# Patient Record
Sex: FEMALE | Race: WHITE | NOT HISPANIC OR LATINO | URBAN - METROPOLITAN AREA
[De-identification: names, ages, dates, MRNs, and addresses within clinical notes are randomized per-mention and may not be internally consistent; named-entity substitution may affect disease eponyms.]

---

## 2017-05-24 ENCOUNTER — EMERGENCY (EMERGENCY)
Facility: HOSPITAL | Age: 67
LOS: 0 days | Discharge: HOME | End: 2017-05-25
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
## 2017-06-28 DIAGNOSIS — S00.83XA CONTUSION OF OTHER PART OF HEAD, INITIAL ENCOUNTER: ICD-10-CM

## 2017-06-28 DIAGNOSIS — Z90.10 ACQUIRED ABSENCE OF UNSPECIFIED BREAST AND NIPPLE: ICD-10-CM
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
## 2018-04-27 ENCOUNTER — INPATIENT (INPATIENT)
Facility: HOSPITAL | Age: 68
LOS: 3 days | Discharge: HOME | End: 2018-05-01
Attending: INTERNAL MEDICINE | Admitting: INTERNAL MEDICINE

## 2018-04-27 VITALS
HEART RATE: 75 BPM | OXYGEN SATURATION: 98 % | DIASTOLIC BLOOD PRESSURE: 66 MMHG | SYSTOLIC BLOOD PRESSURE: 135 MMHG | RESPIRATION RATE: 18 BRPM | TEMPERATURE: 97 F

## 2018-04-27 RX ORDER — DIPHENHYDRAMINE HCL 50 MG
50 CAPSULE ORAL ONCE
Qty: 0 | Refills: 0 | Status: DISCONTINUED | OUTPATIENT
Start: 2018-04-27 | End: 2018-04-28

## 2018-04-27 NOTE — ED PROVIDER NOTE - NS ED ROS FT
Review of Systems:  	•	CONSTITUTIONAL - no fever, no diaphoresis, no weight change  	•	SKIN - no rash  	•	HEMATOLOGIC - no bleeding, no bruising  	•	EYES - no eye pain, no blurred vision  	•	ENT - no change in hearing, no pain  	•	RESPIRATORY - no shortness of breath, no cough  	•	CARDIAC - no chest pain, no palpitations  	•	GI - no abd pain, no nausea, no vomiting, no diarrhea, no constipation, no bleeding  	•	ENDO - no polydypsia, no polyurea, no heat/no cold intolerance  	•	MUSCULOSKELETAL - no joint paint, no swelling, no redness  	•	NEUROLOGIC - no weakness, no headache, no anesthesia, no paresthesias, intoxicated

## 2018-04-27 NOTE — ED ADULT TRIAGE NOTE - CHIEF COMPLAINT QUOTE
pt drank some whine and took a pill of valium to go to sleep.  Denies any suicidal/homicidal ideations.

## 2018-04-27 NOTE — ED PROVIDER NOTE - PHYSICAL EXAMINATION
VITAL SIGNS: I have reviewed nursing notes and confirm.  CONSTITUTIONAL: Well-developed; well-nourished; in no acute distress.  SKIN: Skin exam is warm and dry, no acute rash.  HEAD: Normocephalic; atraumatic.  EYES: PERRL, EOM intact; conjunctiva and sclera clear.  ENT: No nasal discharge; airway clear. TMs clear.  NECK: Supple; non tender.  CARD: S1, S2 normal; no murmurs, gallops, or rubs. Regular rate and rhythm.  RESP: No wheezes, rales or rhonchi.  ABD: Normal bowel sounds; soft; non-distended; non-tender; no hepatosplenomegaly.  EXT: Normal ROM. No clubbing, cyanosis or edema.  NEURO: Alert, oriented. Grossly unremarkable. No focal deficits. slurred speech, moving all ext  PSYCH: Cooperative, appropriate. intoxicated,

## 2018-04-27 NOTE — ED PROVIDER NOTE - OBJECTIVE STATEMENT
68 yo f with pmh of breast ca, etoh abuse, biba with c/o intox.   called ems because pt was unable to walk and was not making any sense.  pt now reports that she took 2 10mg valium (her usual night dose) but also drank a bottle of wine in the car.   says was given rx for valium because she has been having difficulty sleeping.  pt denies any symptoms besides feeling sleepy.  no headache, no trauma, no cp, no sob, no abd pain, no n/v/d.

## 2018-04-28 DIAGNOSIS — Z90.10 ACQUIRED ABSENCE OF UNSPECIFIED BREAST AND NIPPLE: Chronic | ICD-10-CM

## 2018-04-28 DIAGNOSIS — F19.10 OTHER PSYCHOACTIVE SUBSTANCE ABUSE, UNCOMPLICATED: ICD-10-CM

## 2018-04-28 LAB
ALBUMIN SERPL ELPH-MCNC: 4.8 G/DL — SIGNIFICANT CHANGE UP (ref 3.5–5.2)
ALP SERPL-CCNC: 104 U/L — SIGNIFICANT CHANGE UP (ref 30–115)
ALT FLD-CCNC: 17 U/L — SIGNIFICANT CHANGE UP (ref 0–41)
AMPHET UR-MCNC: NEGATIVE — SIGNIFICANT CHANGE UP
ANION GAP SERPL CALC-SCNC: 15 MMOL/L — HIGH (ref 7–14)
APAP SERPL-MCNC: 6 UG/ML — LOW (ref 10–30)
APPEARANCE UR: CLEAR — SIGNIFICANT CHANGE UP
AST SERPL-CCNC: 23 U/L — SIGNIFICANT CHANGE UP (ref 0–41)
BACTERIA # UR AUTO: (no result)
BARBITURATES UR SCN-MCNC: NEGATIVE — SIGNIFICANT CHANGE UP
BENZODIAZ UR-MCNC: POSITIVE
BILIRUB SERPL-MCNC: 0.2 MG/DL — SIGNIFICANT CHANGE UP (ref 0.2–1.2)
BILIRUB UR-MCNC: NEGATIVE — SIGNIFICANT CHANGE UP
BUN SERPL-MCNC: 18 MG/DL — SIGNIFICANT CHANGE UP (ref 10–20)
CALCIUM SERPL-MCNC: 9.3 MG/DL — SIGNIFICANT CHANGE UP (ref 8.5–10.1)
CHLORIDE SERPL-SCNC: 103 MMOL/L — SIGNIFICANT CHANGE UP (ref 98–110)
CO2 SERPL-SCNC: 26 MMOL/L — SIGNIFICANT CHANGE UP (ref 17–32)
COCAINE METAB.OTHER UR-MCNC: NEGATIVE — SIGNIFICANT CHANGE UP
COLOR SPEC: YELLOW — SIGNIFICANT CHANGE UP
CREAT SERPL-MCNC: 1 MG/DL — SIGNIFICANT CHANGE UP (ref 0.7–1.5)
DIFF PNL FLD: (no result)
DRUG SCREEN 1, URINE RESULT: SIGNIFICANT CHANGE UP
EPI CELLS # UR: (no result) /HPF
GLUCOSE SERPL-MCNC: 88 MG/DL — SIGNIFICANT CHANGE UP (ref 70–99)
GLUCOSE UR QL: NEGATIVE MG/DL — SIGNIFICANT CHANGE UP
HAV IGM SER-ACNC: SIGNIFICANT CHANGE UP
HBV CORE IGM SER-ACNC: SIGNIFICANT CHANGE UP
HBV SURFACE AG SER-ACNC: SIGNIFICANT CHANGE UP
HCT VFR BLD CALC: 38.3 % — SIGNIFICANT CHANGE UP (ref 37–47)
HCV AB S/CO SERPL IA: 0.16 S/CO — SIGNIFICANT CHANGE UP
HCV AB SERPL-IMP: SIGNIFICANT CHANGE UP
HGB BLD-MCNC: 12.9 G/DL — SIGNIFICANT CHANGE UP (ref 12–16)
KETONES UR-MCNC: NEGATIVE — SIGNIFICANT CHANGE UP
LEUKOCYTE ESTERASE UR-ACNC: NEGATIVE — SIGNIFICANT CHANGE UP
MCHC RBC-ENTMCNC: 31 PG — SIGNIFICANT CHANGE UP (ref 27–31)
MCHC RBC-ENTMCNC: 33.7 G/DL — SIGNIFICANT CHANGE UP (ref 32–37)
MCV RBC AUTO: 92.1 FL — SIGNIFICANT CHANGE UP (ref 81–99)
METHADONE UR-MCNC: NEGATIVE — SIGNIFICANT CHANGE UP
NITRITE UR-MCNC: NEGATIVE — SIGNIFICANT CHANGE UP
NRBC # BLD: 0 /100 WBCS — SIGNIFICANT CHANGE UP (ref 0–0)
OPIATES UR-MCNC: POSITIVE
PCP UR-MCNC: NEGATIVE — SIGNIFICANT CHANGE UP
PH UR: 5.5 — SIGNIFICANT CHANGE UP (ref 5–8)
PLATELET # BLD AUTO: 261 K/UL — SIGNIFICANT CHANGE UP (ref 130–400)
POTASSIUM SERPL-MCNC: 3.9 MMOL/L — SIGNIFICANT CHANGE UP (ref 3.5–5)
POTASSIUM SERPL-SCNC: 3.9 MMOL/L — SIGNIFICANT CHANGE UP (ref 3.5–5)
PROPOXYPHENE QUALITATIVE URINE RESULT: NEGATIVE — SIGNIFICANT CHANGE UP
PROT SERPL-MCNC: 7.3 G/DL — SIGNIFICANT CHANGE UP (ref 6–8)
PROT UR-MCNC: NEGATIVE MG/DL — SIGNIFICANT CHANGE UP
RBC # BLD: 4.16 M/UL — LOW (ref 4.2–5.4)
RBC # FLD: 11.9 % — SIGNIFICANT CHANGE UP (ref 11.5–14.5)
SALICYLATES SERPL-MCNC: 1.6 MG/DL — LOW (ref 4–30)
SODIUM SERPL-SCNC: 144 MMOL/L — SIGNIFICANT CHANGE UP (ref 135–146)
SP GR SPEC: 1.01 — SIGNIFICANT CHANGE UP (ref 1.01–1.03)
THC UR QL: NEGATIVE — SIGNIFICANT CHANGE UP
UROBILINOGEN FLD QL: 0.2 MG/DL — SIGNIFICANT CHANGE UP (ref 0.2–0.2)
WBC # BLD: 6.08 K/UL — SIGNIFICANT CHANGE UP (ref 4.8–10.8)
WBC # FLD AUTO: 6.08 K/UL — SIGNIFICANT CHANGE UP (ref 4.8–10.8)
WBC UR QL: (no result) /HPF

## 2018-04-28 RX ORDER — IBUPROFEN 200 MG
400 TABLET ORAL EVERY 6 HOURS
Qty: 0 | Refills: 0 | Status: DISCONTINUED | OUTPATIENT
Start: 2018-04-28 | End: 2018-05-01

## 2018-04-28 RX ORDER — HYDROXYZINE HCL 10 MG
50 TABLET ORAL EVERY 6 HOURS
Qty: 0 | Refills: 0 | Status: DISCONTINUED | OUTPATIENT
Start: 2018-04-28 | End: 2018-05-01

## 2018-04-28 RX ORDER — TUBERCULIN PURIFIED PROTEIN DERIVATIVE 5 [IU]/.1ML
5 INJECTION, SOLUTION INTRADERMAL ONCE
Qty: 0 | Refills: 0 | Status: COMPLETED | OUTPATIENT
Start: 2018-04-28 | End: 2018-04-28

## 2018-04-28 RX ORDER — THIAMINE MONONITRATE (VIT B1) 100 MG
100 TABLET ORAL DAILY
Qty: 0 | Refills: 0 | Status: COMPLETED | OUTPATIENT
Start: 2018-04-28 | End: 2018-04-30

## 2018-04-28 RX ORDER — ACETAMINOPHEN 500 MG
650 TABLET ORAL EVERY 6 HOURS
Qty: 0 | Refills: 0 | Status: DISCONTINUED | OUTPATIENT
Start: 2018-04-28 | End: 2018-05-01

## 2018-04-28 RX ORDER — MAGNESIUM HYDROXIDE 400 MG/1
30 TABLET, CHEWABLE ORAL DAILY
Qty: 0 | Refills: 0 | Status: DISCONTINUED | OUTPATIENT
Start: 2018-04-28 | End: 2018-05-01

## 2018-04-28 RX ORDER — HYDROXYZINE HCL 10 MG
100 TABLET ORAL AT BEDTIME
Qty: 0 | Refills: 0 | Status: DISCONTINUED | OUTPATIENT
Start: 2018-04-28 | End: 2018-05-01

## 2018-04-28 RX ADMIN — TUBERCULIN PURIFIED PROTEIN DERIVATIVE 5 UNIT(S): 5 INJECTION, SOLUTION INTRADERMAL at 10:23

## 2018-04-28 RX ADMIN — Medication 100 MILLIGRAM(S): at 09:03

## 2018-04-28 RX ADMIN — Medication 100 MILLIGRAM(S): at 23:35

## 2018-04-28 RX ADMIN — Medication 1 TABLET(S): at 09:02

## 2018-04-28 NOTE — H&P ADULT - ATTENDING COMMENTS
Patient interviewed and examined.    Chart reviewed.    PA's H&P noted and modified, as appropriate.    Case discussed on team rounds    Following is my summary of the case.    Admitted for detox: from ____ED, _x__Intake, ____Med/Surg Floor    Alcohol__x__   Opioid_____  Benzo___ Other_____    Substance amount, duration of use, last usage, and prior attempts at detox or rehabs, are outlined above in the H&P and discussed with patient.    Associated withdrawal symptoms presents.  Comorbid conditions noted. Chronic and Stable.    Past Medical Hx, Psych Hx, family Hx, Social Hx from H&P reviewed and NO changes.    Old medical record and medication Hx. Reviewed    Following items reviewed and addressed:  1. labs  2. EKG  3. Imaging from PACs module    Examination: no change from PA's exam.    Place on following protocol  _____Medically Managed  __X__Medically Supervised    Ciwa__x___Librium taper____Ativan taper___Methadone taper___ Phenobarb taper____ Suboxone Induction____MMTP____    Narcan Kit Offered    Psych Consult __X__N/A  ___Ordered    Physical Therapy  ___X N/A    ___  Ordered    Aftercare disposition to be addressed by counselors.    Estimated length of stay 3-5 days.

## 2018-04-28 NOTE — H&P ADULT - NSHPREVIEWOFSYSTEMS_GEN_ALL_CORE
· Review of Systems: Review of Systems:  		•	CONSTITUTIONAL - no fever, no diaphoresis, no weight change  		•	SKIN - no rash  		•	HEMATOLOGIC - no bleeding, no bruising  		•	EYES - no eye pain, no blurred vision  		•	ENT - no change in hearing, no pain  		•	RESPIRATORY - no shortness of breath, no cough  		•	CARDIAC - no chest pain, no palpitations  		•	GI - no abd pain, no nausea, no vomiting, no diarrhea, no constipation, no bleeding  		•	ENDO - no polydypsia, no polyurea, no heat/no cold intolerance  		•	MUSCULOSKELETAL - no joint paint, no swelling, no redness  	•	NEUROLOGIC - no weakness, no headache, no anesthesia, no paresthesias, intoxicated

## 2018-04-28 NOTE — H&P ADULT - HISTORY OF PRESENT ILLNESS
· Chief Complaint: The patient is a 67y Female complaining of alcohol intoxication.	  · HPI Objective Statement: 68 yo f with pmh of breast ca, etoh abuse, biba with c/o intox.   called ems because pt was unable to walk and was not making any sense.  pt now reports that she took 2 10mg valium (her usual night dose) but also drank a bottle of wine in the car.   says was given rx for valium because she has been having difficulty sleeping.  pt denies any symptoms besides feeling sleepy.  no headache, no trauma, no cp, no sob, no abd pain, no n/v/d. NO H/O  SEIZURES NO  H/O DTS	    PAST MEDICAL/SURGICAL/FAMILY/SOCIAL HISTORY:    Tobacco Usage:  · Tobacco Usage	Unknown if ever smoked	    · Attestation Comment: I have reviewed and confirmed nurses' notes for patient's medications, allergies, medical history, and surgical history.	    ALLERGIES AND HOME MEDICATIONS:   Allergies:        Allergies:  	No Known Allergies:     Home Medications:   * Outpatient Medication Status not yet specified      PHYSICAL EXAM:   · Physical Examination: VITAL SIGNS: I have reviewed nursing notes and confirm.  CONSTITUTIONAL: Well-developed; well-nourished; in no acute distress.  SKIN: Skin exam is warm and dry, no acute rash.  HEAD: Normocephalic; atraumatic.  EYES: PERRL, EOM intact; conjunctiva and sclera clear.  ENT: No nasal discharge; airway clear. TMs clear.  NECK: Supple; non tender.  CARD: S1, S2 normal; no murmurs, gallops, or rubs. Regular rate and rhythm.  RESP: No wheezes, rales or rhonchi.  ABD: Normal bowel sounds; soft; non-distended; non-tender; no hepatosplenomegaly.  EXT: Normal ROM. No clubbing, cyanosis or edema.  NEURO: Alert, oriented. Grossly unremarkable. No focal deficits. slurred speech, moving all ext PSYCH: Cooperative, appropriate. intoxicated,

## 2018-04-28 NOTE — H&P ADULT - NSHPLABSRESULTS_GEN_ALL_CORE
12.9   6.08  )-----------( 261      ( 27 Apr 2018 22:25 )             38.3   04-27    144  |  103  |  18  ----------------------------<  88  3.9   |  26  |  1.0    Ca    9.3      27 Apr 2018 22:25    TPro  7.3  /  Alb  4.8  /  TBili  0.2  /  DBili  x   /  AST  23  /  ALT  17  /  AlkPhos  104  04-27

## 2018-04-28 NOTE — CHART NOTE - NSCHARTNOTEFT_GEN_A_CORE
PPD placed RFA 4/28/2018 to be read 48-72 hours.  EB, RRT PPD placed RFA 4/28/2018 to be read 48-72 hours.  EB, RRT    PPD read 4/30/18  @ 11:30    0mm induration-  Eli SANTOS

## 2018-04-29 RX ORDER — NICOTINE POLACRILEX 2 MG
1 GUM BUCCAL DAILY
Qty: 0 | Refills: 0 | Status: DISCONTINUED | OUTPATIENT
Start: 2018-04-29 | End: 2018-05-01

## 2018-04-29 RX ADMIN — Medication 1 TABLET(S): at 09:47

## 2018-04-29 RX ADMIN — Medication 400 MILLIGRAM(S): at 15:37

## 2018-04-29 RX ADMIN — Medication 100 MILLIGRAM(S): at 09:47

## 2018-04-29 RX ADMIN — Medication 400 MILLIGRAM(S): at 15:35

## 2018-04-29 RX ADMIN — Medication 650 MILLIGRAM(S): at 17:10

## 2018-04-29 RX ADMIN — Medication 1 PATCH: at 09:45

## 2018-04-29 RX ADMIN — Medication 100 MILLIGRAM(S): at 22:18

## 2018-04-29 NOTE — CHART NOTE - NSCHARTNOTEFT_GEN_A_CORE
Subsequent Inpatient Encounter                                       Detox Unit    GEOVANNY MIRANDA   67y   Female      Chief Complaint:    Follow up for Alcohol  Dependency    HPI:     I reviewed previous notes. No Change, except if noted below.             Detail:_    ROS:   I reviewed with patient.  No changes from previous notes except if noted below.             Detail: _    PFSH I reviewed with patient. No changes from previous notes except if noted below.             Detail_    Medication reconciliation performed.    MEDICATIONS  (STANDING):  multivitamin 1 Tablet(s) Oral daily  thiamine 100 milliGRAM(s) Oral daily      MEDICATIONS  (PRN):  acetaminophen   Tablet 650 milliGRAM(s) Oral every 6 hours PRN For Temp greater than 38 C (100.4 F)  aluminum hydroxide/magnesium hydroxide/simethicone Suspension 30 milliLiter(s) Oral every 4 hours PRN Dyspepsia  chlordiazePOXIDE 25 milliGRAM(s) Oral every 2 hours PRN Alcohol Withdrawal Symptoms  chlordiazePOXIDE 50 milliGRAM(s) Oral every 1 hour PRN Alcohol Withdrawal Symptoms  cloNIDine 0.1 milliGRAM(s) Oral every 8 hours PRN SBP>140  hydrOXYzine hydrochloride 50 milliGRAM(s) Oral every 6 hours PRN Anxiety  hydrOXYzine hydrochloride 100 milliGRAM(s) Oral at bedtime PRN INSOMNIA  ibuprofen  Tablet 400 milliGRAM(s) Oral every 6 hours PRN MUSCLE DISCOMFORT  magnesium hydroxide Suspension 30 milliLiter(s) Oral daily PRN Constipation      T(C): 35.7 (18 @ 06:00), Max: 36.7 (18 @ 21:00)  HR: 71 (18 @ 06:00) (59 - 80)  BP: 94/52 (18 @ 06:00) (94/52 - 145/74)  RR: 14 (18 @ 06:00) (14 - 18)  SpO2: --    PHYSICAL EXAM:      Constitutional: NAD, A&O x3    Eyes: PERRLA, no conjuctivitis    Neck: no lymphadenopathy    Respiratory: +air entry, no rales, no rhonchi, no wheezes    Cardiovascular: +S1 and S2, regular rate and rhythm    Gastrointestinal: +BS, soft, non-tender, not distended    Extremities:  no edema, no calf tenderness    Skin: no rashes, normal turgor                            12.9   6.08  )-----------( 261      ( 2018 22:25 )             38.3       144  |  103  |  18  ----------------------------<  88  3.9   |  26  |  1.0    Ca    9.3      2018 22:25    TPro  7.3  /  Alb  4.8  /  TBili  0.2  /  DBili  x   /  AST  23  /  ALT  17  /  AlkPhos  104      Hepatitis B Surface Antigen: Nonreact (18 @ 22:25)  Hepatitis C Virus S/CO Ratio: 0.16 S/CO (18 @ 22:25)    Hepatitis C Virus Interpretation: Nonreact (18 @ 22:25)      Urinalysis Basic - ( 2018 06:43 )    Color: Yellow / Appearance: Clear / S.015 / pH: x  Gluc: x / Ketone: Negative  / Bili: Negative / Urobili: 0.2 mg/dL   Blood: x / Protein: Negative mg/dL / Nitrite: Negative   Leuk Esterase: Negative / RBC: x / WBC 6-10 /HPF   Sq Epi: x / Non Sq Epi: Few /HPF / Bacteria: Few    Drug Screen 1, Urine Result: Done (18 @ 06:43)        Impression and Plan:    Primary Diagnosis:  Alcohol Dependency                                Medication: Librium CIWA Protocol        Continue Detox Protocols. Use of PRNS as needed for withdrawal and comfort.      Labs/ Tests reviewed.      Likely Disposition: ___Home       ___Rehab       ___Outpatient Program    ___Self Help     _____Other    Estimated Length of stay: 2 days

## 2018-04-30 RX ADMIN — Medication 400 MILLIGRAM(S): at 01:33

## 2018-04-30 RX ADMIN — Medication 100 MILLIGRAM(S): at 23:48

## 2018-04-30 RX ADMIN — TUBERCULIN PURIFIED PROTEIN DERIVATIVE 5 UNIT(S): 5 INJECTION, SOLUTION INTRADERMAL at 11:12

## 2018-04-30 RX ADMIN — Medication 1 TABLET(S): at 09:05

## 2018-04-30 RX ADMIN — Medication 100 MILLIGRAM(S): at 09:05

## 2018-04-30 RX ADMIN — Medication 1 PATCH: at 09:08

## 2018-04-30 RX ADMIN — Medication 1 PATCH: at 11:58

## 2018-04-30 RX ADMIN — Medication 400 MILLIGRAM(S): at 09:16

## 2018-04-30 NOTE — CHART NOTE - NSCHARTNOTEFT_GEN_A_CORE
Allergies:  No Known Allergies      Diet: Regular    Activity: as tolerated    Follow up with    1. PMD in 2 weeks    2. Psych in 2 weeks    3.    Follow up for abnormal labs/tests    1.    Extra Instructions:      Flu Vaccine given  Yes_____         No______      Diagnosis:  Chemical Dependency   Maintain sobriety  refrain from all use      Patient Signature___________________________________________  Date_________________      Nurse Signature_____________________________________________Date_________________ Allergies:  No Known Allergies      Diet: Regular    Activity: as tolerated    Follow up with    1. PMD in 2 weeks    2. Psych in 2 weeks    3.Pulmonary in 2 weeks    Follow up for abnormal labs/tests    1. needs Ct scan of chest for abnormal cxr-nodule    Extra Instructions:      Flu Vaccine given  Yes_____         No______      Diagnosis:  Chemical Dependency   Maintain sobriety  refrain from all use      Patient Signature___________________________________________  Date_________________      Nurse Signature_____________________________________________Date_________________

## 2018-04-30 NOTE — CHART NOTE - NSCHARTNOTEFT_GEN_A_CORE
Subsequent Inpatient Encounter                                       Detox Unit    GEOVANNY MIRANDA   67y   Female      Chief Complaint:    Follow up for Alcohol  Dependency    HPI:     I reviewed previous notes. No Change, except if noted below.             Detail:_    ROS:   I reviewed with patient.  No changes from previous notes except if noted below.             Detail: _    PFSH I reviewed with patient. No changes from previous notes except if noted below.             Detail_    Medication reconciliation performed.    MEDICATIONS  (STANDING):  multivitamin 1 Tablet(s) Oral daily  thiamine 100 milliGRAM(s) Oral daily      MEDICATIONS  (PRN):  acetaminophen   Tablet 650 milliGRAM(s) Oral every 6 hours PRN For Temp greater than 38 C (100.4 F)  aluminum hydroxide/magnesium hydroxide/simethicone Suspension 30 milliLiter(s) Oral every 4 hours PRN Dyspepsia  chlordiazePOXIDE 25 milliGRAM(s) Oral every 2 hours PRN Alcohol Withdrawal Symptoms  chlordiazePOXIDE 50 milliGRAM(s) Oral every 1 hour PRN Alcohol Withdrawal Symptoms  cloNIDine 0.1 milliGRAM(s) Oral every 8 hours PRN SBP>140  hydrOXYzine hydrochloride 50 milliGRAM(s) Oral every 6 hours PRN Anxiety  hydrOXYzine hydrochloride 100 milliGRAM(s) Oral at bedtime PRN INSOMNIA  ibuprofen  Tablet 400 milliGRAM(s) Oral every 6 hours PRN MUSCLE DISCOMFORT  magnesium hydroxide Suspension 30 milliLiter(s) Oral daily PRN Constipation      T(C): 35.7 (18 @ 06:00), Max: 36.7 (18 @ 21:00)  HR: 71 (18 @ 06:00) (59 - 80)  BP: 94/52 (18 @ 06:00) (94/52 - 145/74)  RR: 14 (18 @ 06:00) (14 - 18)  SpO2: --    PHYSICAL EXAM:      Constitutional: NAD, A&O x3    Eyes: PERRLA, no conjuctivitis    Neck: no lymphadenopathy    Respiratory: +air entry, no rales, no rhonchi, no wheezes    Cardiovascular: +S1 and S2, regular rate and rhythm    Gastrointestinal: +BS, soft, non-tender, not distended    Extremities:  no edema, no calf tenderness    Skin: no rashes, normal turgor                            12.9   6.08  )-----------( 261      ( 2018 22:25 )             38.3       144  |  103  |  18  ----------------------------<  88  3.9   |  26  |  1.0    Ca    9.3      2018 22:25    TPro  7.3  /  Alb  4.8  /  TBili  0.2  /  DBili  x   /  AST  23  /  ALT  17  /  AlkPhos  104      Hepatitis B Surface Antigen: Nonreact (18 @ 22:25)  Hepatitis C Virus S/CO Ratio: 0.16 S/CO (18 @ 22:25)    Hepatitis C Virus Interpretation: Nonreact (18 @ 22:25)      Urinalysis Basic - ( 2018 06:43 )    Color: Yellow / Appearance: Clear / S.015 / pH: x  Gluc: x / Ketone: Negative  / Bili: Negative / Urobili: 0.2 mg/dL   Blood: x / Protein: Negative mg/dL / Nitrite: Negative   Leuk Esterase: Negative / RBC: x / WBC 6-10 /HPF   Sq Epi: x / Non Sq Epi: Few /HPF / Bacteria: Few    Drug Screen 1, Urine Result: Done (18 @ 06:43)        Impression and Plan:    Primary Diagnosis:  Alcohol Dependency                                Medication: Librium CIWA Protocol        Continue Detox Protocols. Use of PRNS as needed for withdrawal and comfort.      Labs/ Tests reviewed.      Likely Disposition: __X_Home       ___Rehab       ___Outpatient Program    ___Self Help     _____Other    Estimated Length of stay: 3 days

## 2018-05-01 VITALS
HEART RATE: 69 BPM | SYSTOLIC BLOOD PRESSURE: 110 MMHG | RESPIRATION RATE: 16 BRPM | DIASTOLIC BLOOD PRESSURE: 58 MMHG | TEMPERATURE: 97 F

## 2018-05-01 RX ADMIN — Medication 400 MILLIGRAM(S): at 08:16

## 2018-05-01 RX ADMIN — Medication 1 TABLET(S): at 08:13

## 2018-05-14 DIAGNOSIS — F10.20 ALCOHOL DEPENDENCE, UNCOMPLICATED: ICD-10-CM

## 2018-05-14 DIAGNOSIS — M54.5 LOW BACK PAIN: ICD-10-CM

## 2018-05-14 DIAGNOSIS — Z90.11 ACQUIRED ABSENCE OF RIGHT BREAST AND NIPPLE: ICD-10-CM

## 2018-05-14 DIAGNOSIS — Z85.3 PERSONAL HISTORY OF MALIGNANT NEOPLASM OF BREAST: ICD-10-CM
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
## 2019-01-23 ENCOUNTER — OUTPATIENT (OUTPATIENT)
Dept: OUTPATIENT SERVICES | Facility: HOSPITAL | Age: 69
LOS: 1 days | Discharge: HOME | End: 2019-01-23

## 2019-01-23 DIAGNOSIS — T43.624D: ICD-10-CM

## 2019-01-23 DIAGNOSIS — Z90.10 ACQUIRED ABSENCE OF UNSPECIFIED BREAST AND NIPPLE: Chronic | ICD-10-CM

## 2019-03-04 ENCOUNTER — EMERGENCY (EMERGENCY)
Facility: HOSPITAL | Age: 69
LOS: 0 days | Discharge: HOME | End: 2019-03-04
Attending: EMERGENCY MEDICINE | Admitting: EMERGENCY MEDICINE

## 2019-03-04 ENCOUNTER — EMERGENCY (EMERGENCY)
Facility: HOSPITAL | Age: 69
LOS: 0 days | Discharge: HOME | End: 2019-03-04
Attending: EMERGENCY MEDICINE | Admitting: EMERGENCY MEDICINE
Payer: MEDICARE

## 2019-03-04 VITALS
SYSTOLIC BLOOD PRESSURE: 128 MMHG | WEIGHT: 113.98 LBS | RESPIRATION RATE: 19 BRPM | TEMPERATURE: 98 F | OXYGEN SATURATION: 97 % | DIASTOLIC BLOOD PRESSURE: 80 MMHG | HEART RATE: 66 BPM

## 2019-03-04 VITALS
SYSTOLIC BLOOD PRESSURE: 131 MMHG | TEMPERATURE: 99 F | RESPIRATION RATE: 18 BRPM | OXYGEN SATURATION: 99 % | DIASTOLIC BLOOD PRESSURE: 76 MMHG | HEART RATE: 80 BPM

## 2019-03-04 VITALS
RESPIRATION RATE: 18 BRPM | OXYGEN SATURATION: 98 % | HEART RATE: 65 BPM | SYSTOLIC BLOOD PRESSURE: 113 MMHG | DIASTOLIC BLOOD PRESSURE: 58 MMHG

## 2019-03-04 DIAGNOSIS — Z90.10 ACQUIRED ABSENCE OF UNSPECIFIED BREAST AND NIPPLE: Chronic | ICD-10-CM

## 2019-03-04 DIAGNOSIS — M79.662 PAIN IN LEFT LOWER LEG: ICD-10-CM

## 2019-03-04 DIAGNOSIS — Z87.891 PERSONAL HISTORY OF NICOTINE DEPENDENCE: ICD-10-CM

## 2019-03-04 DIAGNOSIS — M25.579 PAIN IN UNSPECIFIED ANKLE AND JOINTS OF UNSPECIFIED FOOT: ICD-10-CM

## 2019-03-04 DIAGNOSIS — Z90.10 ACQUIRED ABSENCE OF UNSPECIFIED BREAST AND NIPPLE: ICD-10-CM

## 2019-03-04 DIAGNOSIS — M79.661 PAIN IN RIGHT LOWER LEG: ICD-10-CM

## 2019-03-04 DIAGNOSIS — M54.5 LOW BACK PAIN: ICD-10-CM

## 2019-03-04 PROCEDURE — 93970 EXTREMITY STUDY: CPT | Mod: 26

## 2019-03-04 RX ORDER — KETOROLAC TROMETHAMINE 30 MG/ML
30 SYRINGE (ML) INJECTION ONCE
Qty: 0 | Refills: 0 | Status: DISCONTINUED | OUTPATIENT
Start: 2019-03-04 | End: 2019-03-04

## 2019-03-04 RX ORDER — OXYCODONE AND ACETAMINOPHEN 5; 325 MG/1; MG/1
1 TABLET ORAL ONCE
Qty: 0 | Refills: 0 | Status: DISCONTINUED | OUTPATIENT
Start: 2019-03-04 | End: 2019-03-04

## 2019-03-04 RX ADMIN — OXYCODONE AND ACETAMINOPHEN 1 TABLET(S): 5; 325 TABLET ORAL at 15:20

## 2019-03-04 RX ADMIN — Medication 30 MILLIGRAM(S): at 19:55

## 2019-03-04 NOTE — ED PROVIDER NOTE - CARE PROVIDERS DIRECT ADDRESSES
[email or web address]

## 2019-03-04 NOTE — ED PROVIDER NOTE - CARE PROVIDER_API CALL
Jayce Crocker)  EEGEpilepsy; Neurology  [address], [address]  [place] [address]  Phone: [phone]  Fax: [phone]  Follow Up Time:     Gerardo Broussard)  Surgery  [address], [address] [place] [address]  Phone: [phone]  Fax: [phone]  Follow Up Time:

## 2019-03-04 NOTE — ED PROVIDER NOTE - PROGRESS NOTE DETAILS
Will give IM Toradol and DC. Patient has close follow-up. Strict return precaution signs/sxs given of when to return to ED.

## 2019-03-04 NOTE — ED PROVIDER NOTE - ATTENDING CONTRIBUTION TO CARE
67yo F h/o OA, lower back pain p/w left calf and ankle pain for months. Associated with decreased sensation and cold feet. Symptoms have alternated between right and left, no diff breathing, no change with leg elevation or exertion. On PRN gabapentin, with min relief. Exam demonstrates intact pulses. Venous insufficient skin changes. Sensation to soft touch intact, strength intact, left calf tend, no edema. No midline back tend. Neg SLR. Peripheral neuropathy less likely vascular insuff given intact pulses. Will eval for dvt and refer to neuro/ vasc

## 2019-03-04 NOTE — ED PROVIDER NOTE - CLINICAL SUMMARY MEDICAL DECISION MAKING FREE TEXT BOX
US negative. pt ambulatory Feels and appears well. No complaints at present. Eager to leave. Stable for discharge. Patient was given strict return and follow up precautions. The patient has been informed of all concerning signs and symptoms to return to Emergency Department, the necessity to follow up with PMD/Clinic/follow up provided within 2-3 days was explained, and the patient reports understanding of above with capacity and insight.

## 2019-03-04 NOTE — ED PROVIDER NOTE - OBJECTIVE STATEMENT
69yo F with PMH of scoliosis, paresthesias/chronic leg pain (mostly left-sided, takes Gabapentin), and mastectomy presenting with increased left leg pain. Patient has been evaluated twice in the last month for same problem and had two negative duplex ultrasounds done, just left [facility] ED a few hours PTA here. Patient states she was given a Percocet there that did not help her pain and just wants something to get through the night. Denies any CP, SOB, abdominal pain, incontinence, trauma to the area, cyanosis, edema, or history of DVT/PE. 67yo F with PMH of scoliosis, paresthesias/chronic leg pain (mostly left-sided, takes Gabapentin), and mastectomy presenting with increased left leg pain. Patient has been evaluated twice in the last month for same problem and had two negative duplex ultrasounds done, just left [facility] ED a few hours PTA here and had negative US for DVT. Patient states she was given a Percocet there that did not help her pain and just wants something to get through the night. Denies any CP, SOB, abdominal pain, incontinence, trauma to the area, cyanosis, edema, or history of DVT/PE.

## 2019-03-04 NOTE — ED PROVIDER NOTE - NS ED ROS FT
Constitutional:  No fevers or chills.  Eyes:  No visual changes.  ENT:  No sore throat.  Neck:  No neck pain.  Cardiac:  No CP or edema.  Resp:  No cough or SOB.  GI:  No nausea, vomiting, diarrhea, or abdominal pain.  MSK:  No myalgias, +left lower leg pain.  Neuro:  No headache, dizziness, or weakness.  Skin:  No skin rash.

## 2019-03-04 NOTE — ED PROVIDER NOTE - ATTENDING CONTRIBUTION TO CARE
68F to ED for eval of leg pain.  Acute on chronic exacerbation of pain. pt seen at [facility] south earlier today and d/c home.  pt present to ED north as pain not controlled.   AVSS, exam as noted,

## 2019-03-04 NOTE — ED ADULT TRIAGE NOTE - CHIEF COMPLAINT QUOTE
left leg pain, was just d'c, from [facility] site , pt. requesting pain meds and MRI/ venous duplex performed at [facility] was negative

## 2019-03-04 NOTE — ED ADULT NURSE NOTE - CHIEF COMPLAINT QUOTE
left leg pain, was just d'c, from [facility] site , pt. requesting pain meds and MRI/ venous duplex performed at [facility] was negative

## 2019-03-04 NOTE — ED PROVIDER NOTE - PHYSICAL EXAMINATION
GEN: Alert & Oriented x 3, No acute distress. Calm, appropriate.  Eyes: PERRL. No conjunctival injection. No scleral icterus.   RESP: Lungs clear to auscult bilat. no wheezes, rhonchi or rales. No retractions. Equal air entry.  CARDIO: regular rate and rhythm, no murmurs, rubs or gallops. Normal S1, S2. Radial pulses 2+ bilaterally. No lower extremity edema. Distal pedal pulses present bilaterally.  MS: Full ROM of extremities. No tenderness with palpation of lower extremities. No leg swelling. No calf tenderness.  ABD: Soft, non-distended. No tenderness with palpation x 4 quadrants.  SKIN: no rashes/lesions, no petechiae, no ecchymosis.  NEURO: CN II-XII grossly intact. Strength intact lower extremities. Decreased sensation bilateral feet. Speech and cognition normal.

## 2019-03-04 NOTE — ED PROVIDER NOTE - OBJECTIVE STATEMENT
The patient is a 68y Female with PMH of osteoarthritis and scoliosis is presenting to ED with bilateral leg pain x 2 days. Patient states she has had intermittent lower extremity pain and paresthesias for 1 mo. She states it normally goes away within the day but states it's been constant since yesterday. The patient is a 68y Female with PMH of osteoarthritis and scoliosis is presenting to ED with bilateral leg pain x 2 days. Patient states she has had intermittent lower extremity pain and paresthesias for 1 mo. She states it normally goes away within the day but states it's been constant since yesterday. She states she takes gabapentin as needed for the pain. She also had an US performed 1mo ago, unsure of results. She denies trauma, lower extremity swelling, back pain, skin changes, abd pain, chest pain/sob.

## 2019-03-04 NOTE — ED PROVIDER NOTE - NS ED ROS FT
GEN: (-) fever, (-) chills, (-) malaise  HEENT: (-) vision changes, (-) HA  CV: (-) chest pain, (-) palpitations, (-) edema  PULM: (-) cough, (-) wheezing, (-) dyspnea  GI: (-) abdominal pain,(-) Nausea, (-) Vomiting, (-) Diarrhea  NEURO: (-) weakness, (+) paresthesias, (-) syncope  : (-) dysuria, (-) frequency, (-) urgency, (-) incontinence   MS: (-) back pain, (+) leg pain, (-)myalgias, (-) swelling  SKIN: (-) rashes, (-) new lesions  HEME: (-) bleeding, (-) ecchymosis

## 2019-03-04 NOTE — ED PROVIDER NOTE - PHYSICAL EXAMINATION
PHYSICAL EXAM: I have reviewed current vital signs.  GENERAL: NAD, well-nourished; well-developed.  HEAD:  Normocephalic, atraumatic.  EYES: Conjunctiva and sclera clear.  ENT: MMM.  NECK: Supple, full ROM.  CHEST/LUNG: Clear to auscultation bilaterally; no wheezes, rales, or rhonchi.  HEART: Regular rate and rhythm, normal S1 and S2; no murmurs, rubs, or gallops.  EXTREMITIES:  2+ peripheral pulses; left leg- atraumatic, NV intact. 2+ DP pulse bilaterally. No BLE edema.   NEUROLOGY: A&O x 3. Motor 5/5. No focal neurological deficits.  SKIN: No rashes or lesions. PHYSICAL EXAM: I have reviewed current vital signs.  GENERAL: NAD, well-nourished; well-developed.  HEAD:  Normocephalic, atraumatic.  EYES: Conjunctiva and sclera clear.  ENT: MMM.  NECK: Supple, full ROM.  CHEST/LUNG: Clear to auscultation bilaterally; no wheezes, rales, or rhonchi.  HEART: Regular rate and rhythm, normal S1 and S2; no murmurs, rubs, or gallops.  EXTREMITIES:  2+ peripheral pulses; left leg- atraumatic, no calf swelling, NV intact. 2+ DP pulse bilaterally. No BLE edema.   NEUROLOGY: A&O x 3. Motor 5/5. No focal neurological deficits.  SKIN: No rashes or lesions.

## 2019-03-04 NOTE — ED ADULT NURSE NOTE - CHIEF COMPLAINT QUOTE
intermittent left ankle and calf pain since january intermittent left ankle and calf pain since January

## 2019-03-04 NOTE — ED PROVIDER NOTE - NSFOLLOWUPINSTRUCTIONS_ED_ALL_ED_FT
Please follow-up with your doctor as soon as possible. Return to ED for chest pain, severe abdominal pain, uncontrolled vomiting/diarrhea, inability to eat/drink, or changes in mental status.

## 2019-03-04 NOTE — ED ADULT NURSE NOTE - NSIMPLEMENTINTERV_GEN_ALL_ED
Implemented All Fall with Harm Risk Interventions:  [place] to call system. Call bell, personal items and telephone within reach. Instruct patient to call for assistance. Room bathroom lighting operational. Non-slip footwear when patient is off stretcher. Physically safe environment: no spills, clutter or unnecessary equipment. Stretcher in lowest position, wheels locked, appropriate side rails in place. Provide visual cue, wrist band, yellow gown, etc. Monitor gait and stability. Monitor for mental status changes and reorient to person, place, and time. Review medications for side effects contributing to fall risk. Reinforce activity limits and safety measures with patient and family. Provide visual clues: red socks.

## 2019-03-04 NOTE — ED PROVIDER NOTE - NSFOLLOWUPINSTRUCTIONS_ED_ALL_ED_FT
Take Motrin or Tylenol as needed for pain. Continue home medication of gabapentin. Follow up with neuro or vascular for continued pain.     RETURN TO ED FOR LEG SWELLING, PALE/COOL EXTREMITIES, WORSENING NUMBNESS/TINGLING, CHEST PAIN, SHORTNESS OF BREATH.

## 2019-04-04 PROBLEM — Z00.00 ENCOUNTER FOR PREVENTIVE HEALTH EXAMINATION: Status: ACTIVE | Noted: 2019-04-04

## 2020-07-09 NOTE — ED ADULT NURSE NOTE - NSSISCREENINGQ1_ED_A_ED
Please review my chart message and advise.    I'm going to stop ingesting alcohol for 6-8 months and retest before proceeding with any type of biopsy.     Thanks,     Kahlil BRADLEY RN  EP Triage     No

## 2024-02-12 ENCOUNTER — NON-APPOINTMENT (OUTPATIENT)
Age: 74
End: 2024-02-12

## 2025-06-18 ENCOUNTER — NON-APPOINTMENT (OUTPATIENT)
Age: 75
End: 2025-06-18